# Patient Record
Sex: FEMALE | URBAN - METROPOLITAN AREA
[De-identification: names, ages, dates, MRNs, and addresses within clinical notes are randomized per-mention and may not be internally consistent; named-entity substitution may affect disease eponyms.]

---

## 2022-03-16 ENCOUNTER — WEB ENCOUNTER (OUTPATIENT)
Dept: URBAN - METROPOLITAN AREA CLINIC 98 | Facility: CLINIC | Age: 57
End: 2022-03-16

## 2022-03-16 ENCOUNTER — OFFICE VISIT (OUTPATIENT)
Dept: URBAN - METROPOLITAN AREA CLINIC 98 | Facility: CLINIC | Age: 57
End: 2022-03-16
Payer: COMMERCIAL

## 2022-03-16 VITALS
DIASTOLIC BLOOD PRESSURE: 69 MMHG | SYSTOLIC BLOOD PRESSURE: 112 MMHG | WEIGHT: 175.4 LBS | HEART RATE: 67 BPM | BODY MASS INDEX: 33.12 KG/M2 | HEIGHT: 61 IN | TEMPERATURE: 97.2 F

## 2022-03-16 DIAGNOSIS — K76.0 NAFLD (NONALCOHOLIC FATTY LIVER DISEASE): ICD-10-CM

## 2022-03-16 DIAGNOSIS — E66.9 OBESITY (BMI 30.0-34.9): ICD-10-CM

## 2022-03-16 DIAGNOSIS — E88.81 METABOLIC SYNDROME: ICD-10-CM

## 2022-03-16 DIAGNOSIS — I67.1 BRAIN ANEURYSM: ICD-10-CM

## 2022-03-16 DIAGNOSIS — Z12.11 COLON CANCER SCREENING: ICD-10-CM

## 2022-03-16 DIAGNOSIS — R10.32 LLQ ABDOMINAL PAIN: ICD-10-CM

## 2022-03-16 DIAGNOSIS — K21.9 GERD WITHOUT ESOPHAGITIS: ICD-10-CM

## 2022-03-16 DIAGNOSIS — Q61.3 POLYCYSTIC KIDNEY DISEASE: ICD-10-CM

## 2022-03-16 PROCEDURE — 99203 OFFICE O/P NEW LOW 30 MIN: CPT | Performed by: INTERNAL MEDICINE

## 2022-03-16 PROCEDURE — 99243 OFF/OP CNSLTJ NEW/EST LOW 30: CPT | Performed by: INTERNAL MEDICINE

## 2022-03-16 RX ORDER — SODIUM, POTASSIUM,MAG SULFATES 17.5-3.13G
354ML SOLUTION, RECONSTITUTED, ORAL ORAL
Qty: 345 MILLILITER | Refills: 0 | OUTPATIENT
Start: 2022-03-19 | End: 2022-03-20

## 2022-03-16 RX ORDER — FAMOTIDINE 20 MG/1
1 TABLET AT BEDTIME AS NEEDED TABLET, FILM COATED ORAL ONCE A DAY
Qty: 30 | Refills: 3 | OUTPATIENT
Start: 2022-03-19

## 2022-03-16 NOTE — HPI-TODAY'S VISIT:
Patient referred by Inderjit Clark MD for evaluation of chronic LLQ abdominal pain and chronic GERD. Copy of this consult sent to Dr. Clark. 58 yo pt w Hx PCKD, Brain aneurysm w bleeding 2009 s/p coil embolization, MetS, NAFLD, who for past 8 mos has been c/o intermittent sharp LLQ abdominal pain w radiation to the lower back,w mild obstipation, usually resolves spontaneously after 2 to 3 days. No specific trigger identified. No melenic stools and no hematochezia. NO urologic sxs reported. In addition, she has p-prandial abdominal bloating, distention, gas,w firm abdomen to touch, wo N/V. Rather frequent heartburn / regurgitation wo dysphagia / odynophagia and no nocturnal TACOS sxs. Has gained ~ 15 lbs / 1 yr and denies cardiorespiratory sxs. Normal CPE 12/20. No recent labs.  No prior screening colonoscopy. Denies FHx CC / pp /IBD  / GI malignancies.  No COVID-19 exposure and she's fully COVID-19 vaccinated. No other complaints.

## 2022-03-23 ENCOUNTER — TELEPHONE ENCOUNTER (OUTPATIENT)
Dept: URBAN - METROPOLITAN AREA CLINIC 98 | Facility: CLINIC | Age: 57
End: 2022-03-23

## 2022-03-23 RX ORDER — SODIUM SULFATE, MAGNESIUM SULFATE, AND POTASSIUM CHLORIDE 17.75; 2.7; 2.25 G/1; G/1; G/1
12 TABLETS TABLET ORAL
Qty: 24 TABLETS | Refills: 0 | OUTPATIENT
Start: 2022-03-28 | End: 2022-03-29

## 2022-04-04 ENCOUNTER — TELEPHONE ENCOUNTER (OUTPATIENT)
Dept: URBAN - METROPOLITAN AREA CLINIC 23 | Facility: CLINIC | Age: 57
End: 2022-04-04

## 2022-04-27 LAB
A/G RATIO: 1.5
ALBUMIN: 4.7
ALKALINE PHOSPHATASE: 73
ALT (SGPT): 16
AST (SGOT): 21
BILIRUBIN, TOTAL: 1.1
BUN/CREATININE RATIO: 20
BUN: 16
C-REACTIVE PROTEIN, QUANT: 4
CALCIUM: 9.7
CARBON DIOXIDE, TOTAL: 24
CHLORIDE: 100
CREATININE: 0.82
EGFR: 84
FERRITIN, SERUM: 212
GLOBULIN, TOTAL: 3.2
GLUCOSE: 104
HEMATOCRIT: 44.1
HEMOGLOBIN: 14.6
IRON BIND.CAP.(TIBC): 387
IRON SATURATION: 24
IRON: 92
LIPASE: 42
MCH: 30.8
MCHC: 33.1
MCV: 93
NRBC: (no result)
PLATELETS: 308
POTASSIUM: 4
PROTEIN, TOTAL: 7.9
RBC: 4.74
RDW: 12.5
SODIUM: 140
UIBC: 295
WBC: 6.2

## 2022-05-09 ENCOUNTER — OFFICE VISIT (OUTPATIENT)
Dept: URBAN - METROPOLITAN AREA SURGERY CENTER 18 | Facility: SURGERY CENTER | Age: 57
End: 2022-05-09
Payer: COMMERCIAL

## 2022-05-09 DIAGNOSIS — Z12.11 COLON CANCER SCREENING: ICD-10-CM

## 2022-05-09 PROCEDURE — G8907 PT DOC NO EVENTS ON DISCHARG: HCPCS | Performed by: INTERNAL MEDICINE

## 2022-05-09 PROCEDURE — 45378 DIAGNOSTIC COLONOSCOPY: CPT | Performed by: INTERNAL MEDICINE

## 2022-05-09 RX ORDER — FAMOTIDINE 20 MG/1
1 TABLET AT BEDTIME AS NEEDED TABLET, FILM COATED ORAL ONCE A DAY
Qty: 30 | Refills: 3 | Status: ACTIVE | COMMUNITY
Start: 2022-03-19

## 2022-06-08 ENCOUNTER — OFFICE VISIT (OUTPATIENT)
Dept: URBAN - METROPOLITAN AREA CLINIC 98 | Facility: CLINIC | Age: 57
End: 2022-06-08
Payer: COMMERCIAL

## 2022-06-08 VITALS
WEIGHT: 174.2 LBS | TEMPERATURE: 97.3 F | HEIGHT: 61 IN | HEART RATE: 78 BPM | DIASTOLIC BLOOD PRESSURE: 71 MMHG | BODY MASS INDEX: 32.89 KG/M2 | SYSTOLIC BLOOD PRESSURE: 109 MMHG

## 2022-06-08 DIAGNOSIS — E66.9 OBESITY (BMI 30.0-34.9): ICD-10-CM

## 2022-06-08 DIAGNOSIS — Q61.3 POLYCYSTIC KIDNEY DISEASE: ICD-10-CM

## 2022-06-08 DIAGNOSIS — K21.9 GERD WITHOUT ESOPHAGITIS: ICD-10-CM

## 2022-06-08 DIAGNOSIS — K76.0 NAFLD (NONALCOHOLIC FATTY LIVER DISEASE): ICD-10-CM

## 2022-06-08 DIAGNOSIS — R10.32 LLQ ABDOMINAL PAIN: ICD-10-CM

## 2022-06-08 DIAGNOSIS — E88.81 METABOLIC SYNDROME: ICD-10-CM

## 2022-06-08 DIAGNOSIS — I67.1 BRAIN ANEURYSM: ICD-10-CM

## 2022-06-08 PROCEDURE — 99214 OFFICE O/P EST MOD 30 MIN: CPT | Performed by: INTERNAL MEDICINE

## 2022-06-08 RX ORDER — FAMOTIDINE 20 MG/1
1 TABLET AT BEDTIME AS NEEDED TABLET, FILM COATED ORAL ONCE A DAY
Qty: 30 | Refills: 3 | OUTPATIENT

## 2022-06-08 RX ORDER — FAMOTIDINE 20 MG/1
1 TABLET AT BEDTIME AS NEEDED TABLET, FILM COATED ORAL ONCE A DAY
Qty: 30 | Refills: 3 | Status: ACTIVE | COMMUNITY
Start: 2022-03-19

## 2022-06-08 NOTE — HPI-TODAY'S VISIT:
56 yo pt w Hx PCKD, Brain aneurysm w bleeding 2009 s/p coil embolization, MetS, NAFLD, who for past 8 mos has been c/o intermittent sharp LLQ abdominal pain w radiation to the lower back,w mild obstipation, usually resolves spontaneously after 2 to 3 days. No specific trigger identified. No melenic stools and no hematochezia. Colonoscopy 5/22: L-diverticulosis wo diverticulitis, colonic spasm and sm Hrrds. NO urologic sxs reported. Has been seen by Dr. Jarad Myers from Nephrology for PCKD, renal US pending.  In addition, she has less p-prandial abdominal bloating, distention, gas, and dyspepsia  with  PPI q d.  Has gained ~ 15 lbs / 1 yr and  despite low calorie diet, has been unable to lose much weight. Denies cardiorespiratory sxs. Normal CPE 12/20. No recent labs.  CT scan denies by Insurance. No prior screening colonoscopy. Denies FHx CC / pp /IBD  / GI malignancies.  No COVID-19 exposure and she's fully COVID-19 vaccinated. No other complaints.

## 2022-10-11 ENCOUNTER — OFFICE VISIT (OUTPATIENT)
Dept: URBAN - METROPOLITAN AREA CLINIC 98 | Facility: CLINIC | Age: 57
End: 2022-10-11

## 2022-10-29 ENCOUNTER — CLAIMS CREATED FROM THE CLAIM WINDOW (OUTPATIENT)
Dept: URBAN - METROPOLITAN AREA TELEHEALTH 2 | Facility: TELEHEALTH | Age: 57
End: 2022-10-29
Payer: COMMERCIAL

## 2022-10-29 DIAGNOSIS — R10.32 LLQ ABDOMINAL PAIN: ICD-10-CM

## 2022-10-29 DIAGNOSIS — K21.9 GERD WITHOUT ESOPHAGITIS: ICD-10-CM

## 2022-10-29 DIAGNOSIS — E88.81 METABOLIC SYNDROME: ICD-10-CM

## 2022-10-29 DIAGNOSIS — I67.1 BRAIN ANEURYSM: ICD-10-CM

## 2022-10-29 DIAGNOSIS — E66.9 OBESITY (BMI 30.0-34.9): ICD-10-CM

## 2022-10-29 DIAGNOSIS — K76.0 NAFLD (NONALCOHOLIC FATTY LIVER DISEASE): ICD-10-CM

## 2022-10-29 DIAGNOSIS — Q61.3 POLYCYSTIC KIDNEY DISEASE: ICD-10-CM

## 2022-10-29 PROCEDURE — 99214 OFFICE O/P EST MOD 30 MIN: CPT | Performed by: INTERNAL MEDICINE

## 2022-10-29 RX ORDER — FAMOTIDINE 20 MG/1
1 TABLET AT BEDTIME AS NEEDED TABLET, FILM COATED ORAL ONCE A DAY
Qty: 30 | Refills: 3 | OUTPATIENT

## 2022-10-29 RX ORDER — FAMOTIDINE 20 MG/1
1 TABLET AT BEDTIME AS NEEDED TABLET, FILM COATED ORAL ONCE A DAY
Qty: 30 | Refills: 3 | Status: ACTIVE | COMMUNITY

## 2022-10-29 NOTE — HPI-TODAY'S VISIT:
This is a Telehealth OV to which patient  has agreed to. Limitations of telehealth discussed. Patient understands and agrees to proceed.  Patient's @ home during this virtual OV. 56 yo pt w Hx PCKD, Brain aneurysm w bleeding 2009 s/p coil embolization, MetS, NAFLD,  here for follow up. Reports less  LLQ abdominal pain and  obstipation. No specific trigger identified. No melenic stools and no hematochezia. Colonoscopy 5/22: L-diverticulosis wo diverticulitis, colonic spasm and sm Hrrds. NO urologic sxs reported. Has been seen by Dr. Jarad Myers from Nephrology for PCKD. In addition, she has less p-prandial abdominal bloating, distention, gas, and dyspepsia  with  PPI qd.  Has gained ~ 15 lbs / 1 yr and  despite low calorie diet, has been unable to lose much weight. Denies cardiorespiratory sxs. No recent labs.  CT scan denied by Insurance.  Abdominal US done and results pending. No prior screening colonoscopy.  No COVID-19 exposure and she's fully COVID-19 vaccinated. No other complaints.

## 2022-10-30 ENCOUNTER — DASHBOARD ENCOUNTERS (OUTPATIENT)
Age: 57
End: 2022-10-30

## 2022-10-30 PROBLEM — 197315008: Status: ACTIVE | Noted: 2022-03-19

## 2022-10-30 PROBLEM — 128609009: Status: ACTIVE | Noted: 2022-03-19

## 2022-10-30 PROBLEM — 253883006: Status: ACTIVE | Noted: 2022-03-19

## 2022-10-30 PROBLEM — 266435005: Status: ACTIVE | Noted: 2022-03-16

## 2022-10-30 PROBLEM — 443371000124107: Status: ACTIVE | Noted: 2022-03-16

## 2022-10-30 PROBLEM — 237602007: Status: ACTIVE | Noted: 2022-03-19
